# Patient Record
Sex: MALE | Race: WHITE
[De-identification: names, ages, dates, MRNs, and addresses within clinical notes are randomized per-mention and may not be internally consistent; named-entity substitution may affect disease eponyms.]

---

## 2020-05-25 ENCOUNTER — HOSPITAL ENCOUNTER (OUTPATIENT)
Dept: HOSPITAL 62 - ER | Age: 35
Setting detail: OBSERVATION
LOS: 2 days | Discharge: HOME | End: 2020-05-27
Attending: FAMILY MEDICINE | Admitting: FAMILY MEDICINE
Payer: COMMERCIAL

## 2020-05-25 DIAGNOSIS — B27.90: ICD-10-CM

## 2020-05-25 DIAGNOSIS — R19.7: ICD-10-CM

## 2020-05-25 DIAGNOSIS — J96.01: Primary | ICD-10-CM

## 2020-05-25 DIAGNOSIS — R00.0: ICD-10-CM

## 2020-05-25 DIAGNOSIS — Z79.899: ICD-10-CM

## 2020-05-25 DIAGNOSIS — R63.0: ICD-10-CM

## 2020-05-25 DIAGNOSIS — R05: ICD-10-CM

## 2020-05-25 DIAGNOSIS — Z03.818: ICD-10-CM

## 2020-05-25 DIAGNOSIS — R11.0: ICD-10-CM

## 2020-05-25 DIAGNOSIS — F17.210: ICD-10-CM

## 2020-05-25 LAB
A TYPE INFLUENZA AG: NEGATIVE
ADD MANUAL DIFF: NO
ALBUMIN SERPL-MCNC: 4.3 G/DL (ref 3.5–5)
ALP SERPL-CCNC: 87 U/L (ref 38–126)
ANION GAP SERPL CALC-SCNC: 9 MMOL/L (ref 5–19)
APPEARANCE UR: CLEAR
APTT PPP: YELLOW S
AST SERPL-CCNC: 43 U/L (ref 17–59)
B INFLUENZA AG: NEGATIVE
BASOPHILS # BLD AUTO: 0 10^3/UL (ref 0–0.2)
BASOPHILS NFR BLD AUTO: 0.5 % (ref 0–2)
BILIRUB DIRECT SERPL-MCNC: 0.2 MG/DL (ref 0–0.4)
BILIRUB SERPL-MCNC: 0.6 MG/DL (ref 0.2–1.3)
BILIRUB UR QL STRIP: NEGATIVE
BUN SERPL-MCNC: 8 MG/DL (ref 7–20)
CALCIUM: 9.7 MG/DL (ref 8.4–10.2)
CHLORIDE SERPL-SCNC: 98 MMOL/L (ref 98–107)
CO2 SERPL-SCNC: 30 MMOL/L (ref 22–30)
EOSINOPHIL # BLD AUTO: 0.4 10^3/UL (ref 0–0.6)
EOSINOPHIL NFR BLD AUTO: 3.5 % (ref 0–6)
ERYTHROCYTE [DISTWIDTH] IN BLOOD BY AUTOMATED COUNT: 12.4 % (ref 11.5–14)
GLUCOSE SERPL-MCNC: 95 MG/DL (ref 75–110)
GLUCOSE UR STRIP-MCNC: NEGATIVE MG/DL
HCT VFR BLD CALC: 43.1 % (ref 37.9–51)
HGB BLD-MCNC: 15.3 G/DL (ref 13.5–17)
KETONES UR STRIP-MCNC: (no result) MG/DL
LYMPHOCYTES # BLD AUTO: 1.3 10^3/UL (ref 0.5–4.7)
LYMPHOCYTES NFR BLD AUTO: 12.4 % (ref 13–45)
MCH RBC QN AUTO: 31.4 PG (ref 27–33.4)
MCHC RBC AUTO-ENTMCNC: 35.5 G/DL (ref 32–36)
MCV RBC AUTO: 88 FL (ref 80–97)
MONOCYTES # BLD AUTO: 1.1 10^3/UL (ref 0.1–1.4)
MONOCYTES NFR BLD AUTO: 10.9 % (ref 3–13)
NEUTROPHILS # BLD AUTO: 7.5 10^3/UL (ref 1.7–8.2)
NEUTS SEG NFR BLD AUTO: 72.7 % (ref 42–78)
NITRITE UR QL STRIP: NEGATIVE
PH UR STRIP: 6 [PH] (ref 5–9)
PLATELET # BLD: 370 10^3/UL (ref 150–450)
POTASSIUM SERPL-SCNC: 4 MMOL/L (ref 3.6–5)
PROT SERPL-MCNC: 7.5 G/DL (ref 6.3–8.2)
PROT UR STRIP-MCNC: NEGATIVE MG/DL
RBC # BLD AUTO: 4.88 10^6/UL (ref 4.35–5.55)
SP GR UR STRIP: 1.02
TOTAL CELLS COUNTED % (AUTO): 100 %
UROBILINOGEN UR-MCNC: 4 MG/DL (ref ?–2)
WBC # BLD AUTO: 10.3 10^3/UL (ref 4–10.5)

## 2020-05-25 PROCEDURE — 94640 AIRWAY INHALATION TREATMENT: CPT

## 2020-05-25 PROCEDURE — 81001 URINALYSIS AUTO W/SCOPE: CPT

## 2020-05-25 PROCEDURE — 99285 EMERGENCY DEPT VISIT HI MDM: CPT

## 2020-05-25 PROCEDURE — 71045 X-RAY EXAM CHEST 1 VIEW: CPT

## 2020-05-25 PROCEDURE — 86308 HETEROPHILE ANTIBODY SCREEN: CPT

## 2020-05-25 PROCEDURE — 86664 EPSTEIN-BARR NUCLEAR ANTIGEN: CPT

## 2020-05-25 PROCEDURE — 87205 SMEAR GRAM STAIN: CPT

## 2020-05-25 PROCEDURE — 80053 COMPREHEN METABOLIC PANEL: CPT

## 2020-05-25 PROCEDURE — 36415 COLL VENOUS BLD VENIPUNCTURE: CPT

## 2020-05-25 PROCEDURE — 86663 EPSTEIN-BARR ANTIBODY: CPT

## 2020-05-25 PROCEDURE — 87070 CULTURE OTHR SPECIMN AEROBIC: CPT

## 2020-05-25 PROCEDURE — 85025 COMPLETE CBC W/AUTO DIFF WBC: CPT

## 2020-05-25 PROCEDURE — 87635 SARS-COV-2 COVID-19 AMP PRB: CPT

## 2020-05-25 PROCEDURE — 87040 BLOOD CULTURE FOR BACTERIA: CPT

## 2020-05-25 PROCEDURE — 80048 BASIC METABOLIC PNL TOTAL CA: CPT

## 2020-05-25 PROCEDURE — G0378 HOSPITAL OBSERVATION PER HR: HCPCS

## 2020-05-25 PROCEDURE — 87804 INFLUENZA ASSAY W/OPTIC: CPT

## 2020-05-25 PROCEDURE — 71275 CT ANGIOGRAPHY CHEST: CPT

## 2020-05-25 PROCEDURE — S0119 ONDANSETRON 4 MG: HCPCS

## 2020-05-25 PROCEDURE — 87880 STREP A ASSAY W/OPTIC: CPT

## 2020-05-25 PROCEDURE — 86665 EPSTEIN-BARR CAPSID VCA: CPT

## 2020-05-25 RX ADMIN — IPRATROPIUM BROMIDE AND ALBUTEROL SULFATE PRN ML: 2.5; .5 SOLUTION RESPIRATORY (INHALATION) at 20:19

## 2020-05-25 NOTE — RADIOLOGY REPORT (SQ)
EXAM DESCRIPTION:  CHEST SINGLE VIEW



IMAGES COMPLETED DATE/TIME:  5/25/2020 2:59 pm



REASON FOR STUDY:  LOW O2



COMPARISON:  None.



EXAM PARAMETERS:  NUMBER OF VIEWS: One view.

TECHNIQUE: Single frontal radiographic view of the chest acquired.

RADIATION DOSE: NA

LIMITATIONS: None.



FINDINGS:  LUNGS AND PLEURA: Hazy airspace disease in the lung bases.

MEDIASTINUM AND HILAR STRUCTURES: No masses.  Contour normal.

HEART AND VASCULAR STRUCTURES: Heart normal in size.  Normal vasculature.

BONES: No acute findings.

HARDWARE: None in the chest.

OTHER: No other significant finding.



IMPRESSION:  HAZY AIRSPACE DISEASE IN THE LUNG BASES.



TECHNICAL DOCUMENTATION:  JOB ID:  [id number]

 2011 [employer]- All Rights Reserved



Reading location - IP/workstation name: [email or web address]

## 2020-05-25 NOTE — RADIOLOGY REPORT (SQ)
EXAM DESCRIPTION:  CTA CHEST



IMAGES COMPLETED DATE/TIME:  5/25/2020 4:05 pm



REASON FOR STUDY:  COUGH, HYPOXIA .  Low O2 saturation.



COMPARISON:  Chest radiograph same date.



TECHNIQUE:  CT scan of the chest performed using helical scanning technique with dynamic intravenous 
contrast injection.  Images reviewed with lung, soft tissue and bone windows.  Reconstructed coronal 
and sagittal MPR images reviewed.

Additional 3 dimensional post-processing performed to develop Maximal Intensity Projection images (MI
P).  All images stored on PACS.

All CT scanners at this facility use dose modulation, iterative reconstruction, and/or weight based d
osing when appropriate to reduce radiation dose to as low as reasonably achievable (ALARA).

[facility]: Dose Right  [facility]: CareDose    [facility]: Dose Right    [facility]: Teradose 4D    [facility]: Smart Technologies



CONTRAST TYPE AND DOSE:  contrast/concentration: Isovue 350.00 mg/ml; Total Contrast Delivered: 70.0 
ml; Total Saline Delivered: 36.8 ml

Contrast bolus optimized for the pulmonary arteries. Not diagnostic for the aorta.



RENAL FUNCTION:  GFR > 60.



RADIATION DOSE:  CT Rad equipment meets quality standard of care and radiation dose reduction techniq
ues were employed. CTDIvol: 13.2 - 18.9 mGy. DLP: 790 mGy-cm. .



LIMITATIONS:  None.



FINDINGS:  LUNGS AND PLEURA: The trachea has normal caliber and appearance.  There are patchy areas o
f tree-in-bud and nodular consolidation with areas of ground-glass attenuation in both lungs, most pr
edominant in the right lower low and lingula.  No pleural effusion or pneumothorax.

AORTA AND GREAT VESSELS: No aneurysm.  Contrast bolus not optimized for the aorta.  Arch origin of th
e left vertebral artery, a normal variant.

HEART: No pericardial effusion. No significant coronary artery calcifications.

PULMONARY ARTERIES: No emboli visualized in the main pulmonary arteries or the segmental branches.  E
valuation is somewhat limited due to bolus timing and respiratory motion.

HILAR AND MEDIASTINAL STRUCTURES: No identified masses or abnormal nodes.

HARDWARE: None in the chest.

UPPER ABDOMEN: Left renal cortical cyst.

THYROID AND OTHER SOFT TISSUES: No masses.  No adenopathy.

BONES: No acute or significant finding.

3D MIPS: Confirm above findings.

OTHER: No other significant finding.



IMPRESSION:

1. Patchy areas of tree-in-bud and ground-glass attenuation in both lungs, predominantly in the right
 lower lobe.  Commonly reported imaging features of COVID-19  pneumonia are present. Other processes 
such as influenza pneumonia and organizing pneumonia, as can be seen with drug toxicity and connectiv
e tissue disease, can cause a similar imaging pattern.

2. No pulmonary embolism.  Evaluation is mildly limited due to respiratory motion and bolus timing.

3. Left renal cortical cyst.



COMMENT:  Quality ID # [id number]: Final reports with documentation of one or more dose reduction techniques
 (e.g., Automated exposure control, adjustment of the mA and/or kV according to patient size, use of 
iterative reconstruction technique)



TECHNICAL DOCUMENTATION:  JOB ID:  [id number]

 2011 [employer]- All Rights Reserved



Reading location - IP/workstation name: [email or web address]

## 2020-05-25 NOTE — PDOC H&P
History of Present Illness


History of Present Illness: 


JHONNY KRUEGER is a 34 year old male who was recently discharged from the 

[employer] due to medical issues with his lower back who presents with upper 

respiratory symptoms.  He said he has been sick for the past 10 or 11 days.  His

wife was initially sick 3 or 4 days ahead of him, she was treated with 

amoxicillin for an ear infection and she has since convalesced.  He was also 

given some amoxicillin for the same reason, but he says his symptoms have not 

improved.  He has also used an albuterol inhaler along with some Mucinex and 

some over-the-counter cough medications but his symptoms have not improved.  He 

said he is been intermittently febrile, at least he feels like he has been 

febrile.  He has had a wet cough that has been productive of some whitish 

sputum.  He used to smoke a pack a day for about 6 years but has not smoked 

since any of his symptoms began.  He said his wife was tested for the flu and 

for the coronavirus, both were negative.  He said he has also been tested for 

influenza and coronavirus, both were negative.  He said he thinks it was the 

rapid test because they took the sample 1 day and called him the next day with 

the result.  He denies vaping or smoking any other drugs.  He denies any other 

drug use.  [place]  He said that everybody on basis pretty 

much keeping them to themselves.  He said the only time he is left the house is 

been to go to [employer] or the grocery store.  He lives with his wife, his 2 kids,

and his mother-in-law.  He said his mother-in-law is from [facility] and 

said that when she was there a couple of months ago there was something going 

around and she got it but she got over it.  She was asymptomatic whenever she 

came to their house.  Neither the mother-in-law nor the 2 children have been 

sick since all of this began with the patient and his wife.  In the ER he was 

hypoxic, a couple of times his pulse oximetry dropped into the mid 80s on room 

air.  He was tachycardic and looked flushed.  He had chest x-ray and chest CT 

findings consistent with a bibasilar pneumonitis.  His CBC was unremarkable.  He

is afebrile here.  He has a wet cough and was coughing frequently and he had 

some crackles on chest exam.








Past Medical History


Pulmonary Medical History: Reports: Bronchitis, Pneumonia





Past Surgical History


Past Surgical History: Reports: Orthopedic Surgery - RIGHT KNEE, JAW





Social History


Lives with: Family


Smoking Status: Current Every Day Smoker


Electronic Cigarette use?: No





Family History


Family History: None


Parental Family History Reviewed: Yes - Hypertension, hyperlipidemia


Children Family History Reviewed: Yes - Both healthy


Sibling(s) Family History Reviewed.: Yes - Nothing known





Medication/Allergy


Allergies/Adverse Reactions: 


                                        





No Known Drug Allergies Allergy (Verified 05/25/20 14:14)


   











Review of Systems


All systems: reviewed and no additional remarkable complaints except as stated -

All systems were reviewed and were negative except as noted in the HPI





Physical Exam


Vital Signs: 


                                        











Temp Pulse Resp BP Pulse Ox


 


 98.8 F   81   24 H  136/81 H  92 


 


 05/25/20 14:08  05/25/20 14:08  05/25/20 14:08  05/25/20 14:08  05/25/20 15:24








                                 Intake & Output











 05/24/20 05/25/20 05/26/20





 06:59 06:59 06:59


 


Weight   104.5 kg











General appearance: PRESENT: cooperative, mild distress, well-developed, well-no

urished


Head exam: PRESENT: atraumatic, normocephalic


Eye exam: PRESENT: EOMI, PERRLA.  ABSENT: conjunctival injection, nystagmus, 

scleral icterus


Ear exam: PRESENT: normal external ear exam


Mouth exam: PRESENT: moist, neck supple


Throat exam: ABSENT: post pharyngeal erythema


Neck exam: PRESENT: full ROM.  ABSENT: carotid bruit, JVD, lymphadenopathy, 

meningismus, tenderness, thyromegaly


Respiratory exam: PRESENT: chest wall tenderness, crackles - Bibasilar, 

symmetrical, tachypnea, unlabored.  ABSENT: accessory muscle use, prolonged 

expiratory phas, rhonchi, wheezes


Cardiovascular exam: PRESENT: +S1, +S2, tachycardia.  ABSENT: diastolic murmur, 

systolic murmur


Pulses: PRESENT: normal carotid pulses


Vascular exam: PRESENT: normal capillary refill


GI/Abdominal exam: PRESENT: normal bowel sounds, soft.  ABSENT: distended, 

guarding, rebound, tenderness


Extremities exam: ABSENT: clubbing, pedal edema


Musculoskeletal exam: PRESENT: normal inspection.  ABSENT: deformity


Neurological exam: PRESENT: alert, awake, oriented to person, oriented to place,

oriented to time, oriented to situation, CN II-XII grossly intact.  ABSENT: 

motor sensory deficit


Psychiatric exam: PRESENT: appropriate affect, normal mood


Skin exam: PRESENT: dry, warm, other - Flushed





Results


Laboratory Results: 


                                        





                                 05/25/20 15:17 





                                 05/25/20 15:17 





                                        











  05/25/20 05/25/20 05/25/20





  15:17 15:17 15:32


 


WBC  10.3  


 


RBC  4.88  


 


Hgb  15.3  


 


Hct  43.1  


 


MCV  88  


 


MCH  31.4  


 


MCHC  35.5  


 


RDW  12.4  


 


Plt Count  370  


 


Seg Neutrophils %  72.7  


 


Sodium   136.9 L 


 


Potassium   4.0 


 


Chloride   98 


 


Carbon Dioxide   30 


 


Anion Gap   9 


 


BUN   8 


 


Creatinine   0.88 


 


Est GFR ( Amer)   > 60 


 


Glucose   95 


 


Calcium   9.7 


 


Total Bilirubin   0.6 


 


AST   43 


 


Alkaline Phosphatase   87 


 


Total Protein   7.5 


 


Albumin   4.3 


 


Urine Color    YELLOW


 


Urine Appearance    CLEAR


 


Urine pH    6.0


 


Ur Specific Gravity    1.019


 


Urine Protein    NEGATIVE


 


Urine Glucose (UA)    NEGATIVE


 


Urine Ketones    TRACE H


 


Urine Blood    NEGATIVE


 


Urine Nitrite    NEGATIVE


 


Ur Leukocyte Esterase    NEGATIVE


 


Urine WBC (Auto)    1


 


Urine RBC (Auto)    5











Impressions: 


                                        





Chest X-Ray  05/25/20 00:00


IMPRESSION:  HAZY AIRSPACE DISEASE IN THE LUNG BASES.


 








Chest/Abdomen CTA  05/25/20 14:49


IMPRESSION:


1. Patchy areas of tree-in-bud and ground-glass attenuation in both lungs, 

predominantly in the right lower lobe.  Commonly reported imaging features of 

COVID-19  pneumonia are present. Other processes such as influenza pneumonia and

organizing pneumonia, as can be seen with drug toxicity and connective tissue 

disease, can cause a similar imaging pattern.


2. No pulmonary embolism.  Evaluation is mildly limited due to respiratory 

motion and bolus timing.


3. Left renal cortical cyst.


 














Assessment and Plan





- Diagnosis


(1) Acute hypoxemic respiratory failure


Is this a current diagnosis for this admission?: Yes   


Plan: 


Will monitor on pulse oximetry and use supplemental O2 as needed to maintain 

SPO2 greater than 90%








(2) Suspected infectious disease


Is this a current diagnosis for this admission?: Yes   


Plan: 


While testing him for viral infections, will empirically treat with Rocephin and

azithromycin for potential community acquired pneumonia








(3) Person under investigation for COVID-19


Is this a current diagnosis for this admission?: Yes   


Plan: 


Appropriate precautions taken.  Initial rapid test was negative, but his 

presentation is somewhat suspicious and so we are going to send him out for PCR 

confirmation.








(4) Current every day smoker


Is this a current diagnosis for this admission?: Yes   


Plan: 


Counseled regarding the negative effects of cigarette smoking on his current and

long-term health.  He has not smoked since he first got sick with this about a 

week and a half ago, and I strongly recommended that he quit.








- Time


Time Spent with patient: 35 or more minutes





- Inpatient Certification


Based on my medical assessment, after consideration of the patient's 

comorbidities, presenting symptoms, or acuity I expect that the services needed 

warrant INPATIENT care.: Yes


I certify that my determination is in accordance with my understanding of 

Medicare's requirements for reasonable and necessary INPATIENT services [42 CFR 

412.3e].: Yes


Medical Necessity: Failure to Improve With Outpatient Therapy, Need Close 

Monitoring Due to Risk of Patient Decompensation, Need For Continuous Telemetry 

Monitoring, Need for IV Antibiotics, Risk of Complication if Not Cared For in 

Hospital

## 2020-05-26 LAB
ADD MANUAL DIFF: NO
ANION GAP SERPL CALC-SCNC: 11 MMOL/L (ref 5–19)
BASOPHILS # BLD AUTO: 0 10^3/UL (ref 0–0.2)
BASOPHILS NFR BLD AUTO: 0.4 % (ref 0–2)
BUN SERPL-MCNC: 7 MG/DL (ref 7–20)
CALCIUM: 9.2 MG/DL (ref 8.4–10.2)
CHLORIDE SERPL-SCNC: 99 MMOL/L (ref 98–107)
CO2 SERPL-SCNC: 26 MMOL/L (ref 22–30)
EOSINOPHIL # BLD AUTO: 0.3 10^3/UL (ref 0–0.6)
EOSINOPHIL NFR BLD AUTO: 3.1 % (ref 0–6)
ERYTHROCYTE [DISTWIDTH] IN BLOOD BY AUTOMATED COUNT: 12.3 % (ref 11.5–14)
GLUCOSE SERPL-MCNC: 101 MG/DL (ref 75–110)
HCT VFR BLD CALC: 39.1 % (ref 37.9–51)
HGB BLD-MCNC: 14.1 G/DL (ref 13.5–17)
LYMPHOCYTES # BLD AUTO: 1.4 10^3/UL (ref 0.5–4.7)
LYMPHOCYTES NFR BLD AUTO: 14.2 % (ref 13–45)
MCH RBC QN AUTO: 31.4 PG (ref 27–33.4)
MCHC RBC AUTO-ENTMCNC: 36.1 G/DL (ref 32–36)
MCV RBC AUTO: 87 FL (ref 80–97)
MONOCYTES # BLD AUTO: 1 10^3/UL (ref 0.1–1.4)
MONOCYTES NFR BLD AUTO: 10.4 % (ref 3–13)
NEUTROPHILS # BLD AUTO: 7 10^3/UL (ref 1.7–8.2)
NEUTS SEG NFR BLD AUTO: 71.9 % (ref 42–78)
PLATELET # BLD: 331 10^3/UL (ref 150–450)
POTASSIUM SERPL-SCNC: 4 MMOL/L (ref 3.6–5)
RBC # BLD AUTO: 4.49 10^6/UL (ref 4.35–5.55)
TOTAL CELLS COUNTED % (AUTO): 100 %
WBC # BLD AUTO: 9.7 10^3/UL (ref 4–10.5)

## 2020-05-26 RX ADMIN — IPRATROPIUM BROMIDE AND ALBUTEROL SULFATE PRN ML: 2.5; .5 SOLUTION RESPIRATORY (INHALATION) at 13:57

## 2020-05-26 NOTE — PDOC PROGRESS REPORT
Subjective


Progress Note for:: 05/26/20


Subjective:: 


No adverse events overnight.  No new complaints.  He said he feels a little 

better but he still has a cough.  He still requiring some oxygen per nasal 

cannula.  His appetite has not been great.  He confirmed that his wife was sick,

and then shortly after contact with her he got sick.  His Monospot was positive.

 He said that he was also sick a few months ago after visiting some friends out 

of state.  He does not know if what they had was mono.  His wife has not been 

tested for mono.


Reason For Visit: 


HYPERKALEMIA, HYPOMAGNESEMIA, ELEVATED LIPASE








Physical Exam


Vital Signs: 


                                        











Temp Pulse Resp BP Pulse Ox


 


 98.4 F   86   20   148/80 H  92 


 


 05/26/20 15:04  05/26/20 15:04  05/26/20 15:04  05/26/20 15:04  05/26/20 15:04








                                 Intake & Output











 05/25/20 05/26/20 05/27/20





 06:59 06:59 06:59


 


Intake Total  300 1282


 


Balance  300 1282


 


Weight  101.5 kg 








General appearance: PRESENT: cooperative, no apparent distress, well-developed, 

well-nourished


Respiratory exam: PRESENT: crackles -faint bibasilar, symmetrical, tachypnea, 

unlabored.  ABSENT: accessory muscle use, prolonged expiratory phase, rhonchi, 

wheezes


Cardiovascular exam: PRESENT: +S1, +S2, RRR.  ABSENT: diastolic murmur, systolic

murmur


Pulses: PRESENT: normal carotid pulses


Vascular exam: PRESENT: normal capillary refill


GI/Abdominal exam: PRESENT: normal bowel sounds, soft.  ABSENT: distended, 

guarding, rebound, tenderness


Extremities exam: ABSENT: clubbing, pedal edema


Musculoskeletal exam: PRESENT: normal inspection.  ABSENT: deformity


Neurological exam: PRESENT: alert, awake, oriented to person, oriented to place,

oriented to time, oriented to situation


Psychiatric exam: PRESENT: appropriate affect, normal mood


Skin exam: PRESENT: dry, warm





Results


Laboratory Results: 


                                        





                                 05/26/20 04:40 





                                 05/26/20 04:40 





                                        











  05/26/20 05/26/20





  04:40 04:40


 


WBC  9.7 


 


RBC  4.49 


 


Hgb  14.1 


 


Hct  39.1 


 


MCV  87 


 


MCH  31.4 


 


MCHC  36.1 H 


 


RDW  12.3 


 


Plt Count  331 


 


Seg Neutrophils %  71.9 


 


Sodium   136.3 L


 


Potassium   4.0


 


Chloride   99


 


Carbon Dioxide   26


 


Anion Gap   11


 


BUN   7


 


Creatinine   0.73


 


Est GFR (African Amer)   > 60


 


Glucose   101


 


Calcium   9.2











Impressions: 


                                        





Chest X-Ray  05/25/20 00:00


IMPRESSION:  HAZY AIRSPACE DISEASE IN THE LUNG BASES.


 








Chest/Abdomen CTA  05/25/20 14:49


IMPRESSION:


1. Patchy areas of tree-in-bud and ground-glass attenuation in both lungs, 

predominantly in the right lower lobe.  Commonly reported imaging features of 

COVID-19  pneumonia are present. Other processes such as influenza pneumonia and

organizing pneumonia, as can be seen with drug toxicity and connective tissue 

disease, can cause a similar imaging pattern.


2. No pulmonary embolism.  Evaluation is mildly limited due to respiratory 

motion and bolus timing.


3. Left renal cortical cyst.


 














Assessment and Plan





- Diagnosis


(1) Acute hypoxemic respiratory failure


Is this a current diagnosis for this admission?: Yes   


Plan: 


Will monitor on pulse oximetry and use supplemental O2 as needed to maintain 

SPO2 greater than 90%








(2) Suspected infectious disease


Is this a current diagnosis for this admission?: Yes   


Plan: 


While testing him for viral infections, will empirically treat with Rocephin and

azithromycin for potential community acquired pneumonia.  Monospot was positive,

but he does seem to be doing a little bit better after a day of antibiotics.








(3) Person under investigation for COVID-19


Is this a current diagnosis for this admission?: Yes   


Plan: 


COVID-19 testing was negative








(4) Current every day smoker


Is this a current diagnosis for this admission?: Yes   


Plan: 


Counseled regarding the negative effects of cigarette smoking on his current and

long-term health.  He has not smoked since he first got sick with this about a 

week and a half ago, and I strongly recommended that he quit.








- Time


Time Spent with patient: 15-24 minutes

## 2020-05-27 VITALS — DIASTOLIC BLOOD PRESSURE: 64 MMHG | SYSTOLIC BLOOD PRESSURE: 119 MMHG

## 2020-05-27 LAB
ADD MANUAL DIFF: NO
ANION GAP SERPL CALC-SCNC: 10 MMOL/L (ref 5–19)
BASOPHILS # BLD AUTO: 0.1 10^3/UL (ref 0–0.2)
BASOPHILS NFR BLD AUTO: 0.7 % (ref 0–2)
BUN SERPL-MCNC: 10 MG/DL (ref 7–20)
CALCIUM: 9.1 MG/DL (ref 8.4–10.2)
CHLORIDE SERPL-SCNC: 101 MMOL/L (ref 98–107)
CO2 SERPL-SCNC: 27 MMOL/L (ref 22–30)
EOSINOPHIL # BLD AUTO: 0.4 10^3/UL (ref 0–0.6)
EOSINOPHIL NFR BLD AUTO: 5.6 % (ref 0–6)
ERYTHROCYTE [DISTWIDTH] IN BLOOD BY AUTOMATED COUNT: 12.2 % (ref 11.5–14)
GLUCOSE SERPL-MCNC: 97 MG/DL (ref 75–110)
HCT VFR BLD CALC: 39.8 % (ref 37.9–51)
HGB BLD-MCNC: 13.9 G/DL (ref 13.5–17)
LYMPHOCYTES # BLD AUTO: 1.7 10^3/UL (ref 0.5–4.7)
LYMPHOCYTES NFR BLD AUTO: 22.4 % (ref 13–45)
MCH RBC QN AUTO: 31 PG (ref 27–33.4)
MCHC RBC AUTO-ENTMCNC: 35 G/DL (ref 32–36)
MCV RBC AUTO: 89 FL (ref 80–97)
MONOCYTES # BLD AUTO: 0.9 10^3/UL (ref 0.1–1.4)
MONOCYTES NFR BLD AUTO: 11.4 % (ref 3–13)
NEUTROPHILS # BLD AUTO: 4.7 10^3/UL (ref 1.7–8.2)
NEUTS SEG NFR BLD AUTO: 59.9 % (ref 42–78)
PLATELET # BLD: 345 10^3/UL (ref 150–450)
POTASSIUM SERPL-SCNC: 4.1 MMOL/L (ref 3.6–5)
RBC # BLD AUTO: 4.49 10^6/UL (ref 4.35–5.55)
TOTAL CELLS COUNTED % (AUTO): 100 %
WBC # BLD AUTO: 7.8 10^3/UL (ref 4–10.5)

## 2020-05-27 NOTE — PDOC DISCHARGE SUMMARY
Impression





- Admit/DC Date/PCP


Admission Date/Primary Care Provider: 


  05/25/20 19:11





  





Discharge Date: 05/27/20





- Discharge Diagnosis


(1) Acute hypoxemic respiratory failure


Is this a current diagnosis for this admission?: Yes   





(2) Suspected infectious disease


Is this a current diagnosis for this admission?: Yes   





(3) Person under investigation for COVID-19


Is this a current diagnosis for this admission?: Yes   





(4) Current every day smoker


Is this a current diagnosis for this admission?: Yes   





- Additional Information


Resuscitation Status: Full Code


Discharge Diet: Regular


Discharge Activity: Activity As Tolerated, Balance Activity w/Rest, Energy 

Conservation


Prescriptions: 


Levofloxacin [Levaquin 750 mg Tablet] 750 mg PO DAILY #5 tablet


Home Medications: 








Acetaminophen [Acetaminophen Extra Strength] 2 mg PO DAILYP PRN 05/26/20 


Albuterol Sulfate [Albuterol Sulfate Hfa] 2 puff IH Q4HP PRN 05/26/20 


Guaifenesin [Mucus ER] 1,200 mg PO BID 05/26/20 


Ibuprofen 400 mg PO DAILYP PRN 05/26/20 


Levofloxacin [Levaquin 750 mg Tablet] 750 mg PO DAILY #5 tablet 05/27/20 











History of Present Illiness


History of Present Illness: 


JHONNY KRUEGER is a 34 year old male who was recently discharged from the 

[employer] due to medical issues with his lower back who presents with upper 

respiratory symptoms.  He said he has been sick for the past 10 or 11 days.  His

wife was initially sick 3 or 4 days ahead of him, she was treated with 

amoxicillin for an ear infection and she has since convalesced.  He was also 

given some amoxicillin for the same reason, but he says his symptoms have not 

improved.  He has also used an albuterol inhaler along with some Mucinex and 

some over-the-counter cough medications but his symptoms have not improved.  He 

said he is been intermittently febrile, at least he feels like he has been 

febrile.  He has had a wet cough that has been productive of some whitish 

sputum.  He used to smoke a pack a day for about 6 years but has not smoked 

since any of his symptoms began.  He said his wife was tested for the flu and 

for the coronavirus, both were negative.  He said he has also been tested for 

influenza and coronavirus, both were negative.  He said he thinks it was the 

rapid test because they took the sample 1 day and called him the next day with 

the result.  He denies vaping or smoking any other drugs.  He denies any other 

drug use.  [place].  He said that everybody on basis pretty 

much keeping them to themselves.  He said the only time he is left the house is 

been to go to [employer] or the grocery store.  He lives with his wife, his 2 kids,

and his mother-in-law.  He said his mother-in-law is from [facility] and 

said that when she was there a couple of months ago there was something going 

around and she got it but she got over it.  She was asymptomatic whenever she 

came to their house.  Neither the mother-in-law nor the 2 children have been 

sick since all of this began with the patient and his wife.  In the ER he was 

hypoxic, a couple of times his pulse oximetry dropped into the mid 80s on room 

air.  He was tachycardic and looked flushed.  He had chest x-ray and chest CT 

findings consistent with a bibasilar pneumonitis.  His CBC was unremarkable.  He

is afebrile here.  He has a wet cough and was coughing frequently and he had 

some crackles on chest exam.








Hospital Course


Hospital Course: 


He tested positive for mononucleosis.  He said that he has been sick in the past

year for upper respiratory infection that dragged on for a few weeks, so it 

still possible that his acute illness is not mononucleosis and he still just 

testing positive because a heterophile antibody can be positive for up to a year

after the illness.  He was empirically treated with some antibiotics.  He 

initially needed oxygen, but is now 94% on room air.  He is feeling better.  I 

am going to send him home with a few days of some Levaquin.  I have instructed 

him to return to the hospital if he has a fever or he has trouble breathing.  He

verbalizes understanding.  I again recommended that he continue to quit smoking.

 His labs and examination were reassuring and he was discharged in stable 

condition.





Physical Exam


Vital Signs: 


                                        











Temp Pulse Resp BP Pulse Ox


 


 97.9 F   76   16   130/78 H  96 


 


 05/27/20 11:30  05/27/20 14:32  05/27/20 14:32  05/27/20 11:30  05/27/20 14:32








                                 Intake & Output











 05/26/20 05/27/20 05/28/20





 06:59 06:59 06:59


 


Intake Total 300 1862 120


 


Output Total  220 


 


Balance 300 1642 120


 


Weight 101.5 kg 101.5 kg 








General appearance: PRESENT: cooperative, no apparent distress, well-developed, 

well-nourished


Respiratory exam: PRESENT: crackles -faint bibasilar, symmetrical, tachypnea, 

unlabored.  ABSENT: accessory muscle use, prolonged expiratory phase, rhonchi, 

wheezes


Cardiovascular exam: PRESENT: +S1, +S2, RRR.  ABSENT: diastolic murmur, systolic

murmur


Pulses: PRESENT: normal carotid pulses


Vascular exam: PRESENT: normal capillary refill


GI/Abdominal exam: PRESENT: normal bowel sounds, soft.  ABSENT: distended, 

guarding, rebound, tenderness


Extremities exam: ABSENT: clubbing, pedal edema


Musculoskeletal exam: PRESENT: normal inspection.  ABSENT: deformity


Neurological exam: PRESENT: alert, awake, oriented to person, oriented to place,

oriented to time, oriented to situation


Psychiatric exam: PRESENT: appropriate affect, normal mood


Skin exam: PRESENT: dry, warm





Results


Laboratory Results: 


                                        











WBC  7.8 10^3/uL (4.0-10.5)   05/27/20  04:41    


 


RBC  4.49 10^6/uL (4.35-5.55)   05/27/20  04:41    


 


Hgb  13.9 g/dL (13.5-17.0)   05/27/20  04:41    


 


Hct  39.8 % (37.9-51.0)   05/27/20  04:41    


 


MCV  89 fl (80-97)   05/27/20  04:41    


 


MCH  31.0 pg (27.0-33.4)   05/27/20  04:41    


 


MCHC  35.0 g/dL (32.0-36.0)   05/27/20  04:41    


 


RDW  12.2 % (11.5-14.0)   05/27/20  04:41    


 


Plt Count  345 10^3/uL (150-450)   05/27/20  04:41    


 


Lymph % (Auto)  22.4 % (13-45)   05/27/20  04:41    


 


Mono % (Auto)  11.4 % (3-13)   05/27/20  04:41    


 


Eos % (Auto)  5.6 % (0-6)   05/27/20  04:41    


 


Baso % (Auto)  0.7 % (0-2)   05/27/20  04:41    


 


Absolute Neuts (auto)  4.7 10^3/uL (1.7-8.2)   05/27/20  04:41    


 


Absolute Lymphs (auto)  1.7 10^3/uL (0.5-4.7)   05/27/20  04:41    


 


Absolute Monos (auto)  0.9 10^3/uL (0.1-1.4)   05/27/20  04:41    


 


Absolute Eos (auto)  0.4 10^3/uL (0.0-0.6)   05/27/20  04:41    


 


Absolute Basos (auto)  0.1 10^3/uL (0.0-0.2)   05/27/20  04:41    


 


Seg Neutrophils %  59.9 % (42-78)   05/27/20  04:41    


 


Sodium  138.1 mmol/L (137-145)   05/27/20  04:41    


 


Potassium  4.1 mmol/L (3.6-5.0)   05/27/20  04:41    


 


Chloride  101 mmol/L ()   05/27/20  04:41    


 


Carbon Dioxide  27 mmol/L (22-30)   05/27/20  04:41    


 


Anion Gap  10  (5-19)   05/27/20  04:41    


 


BUN  10 mg/dL (7-20)   05/27/20  04:41    


 


Creatinine  0.72 mg/dL (0.52-1.25)   05/27/20  04:41    


 


Est GFR ( Amer)  > 60  (>60)   05/27/20  04:41    


 


Est GFR (MDRD) Non-Af  > 60  (>60)   05/27/20  04:41    


 


Glucose  97 mg/dL ()   05/27/20  04:41    


 


Calcium  9.1 mg/dL (8.4-10.2)   05/27/20  04:41    


 


Total Bilirubin  0.6 mg/dL (0.2-1.3)   05/25/20  15:17    


 


Direct Bilirubin  0.2 mg/dL (0.0-0.4)   05/25/20  15:17    


 


Neonat Total Bilirubin  Not Reportable   05/25/20  15:17    


 


Neonat Direct Bilirubin  Not Reportable   05/25/20  15:17    


 


Neonat Indirect Bili  Not Reportable   05/25/20  15:17    


 


AST  43 U/L (17-59)   05/25/20  15:17    


 


ALT  60 U/L (<50)  H  05/25/20  15:17    


 


Alkaline Phosphatase  87 U/L ()   05/25/20  15:17    


 


Total Protein  7.5 g/dL (6.3-8.2)   05/25/20  15:17    


 


Albumin  4.3 g/dL (3.5-5.0)   05/25/20  15:17    


 


Urine Color  YELLOW   05/25/20  15:32    


 


Urine Appearance  CLEAR   05/25/20  15:32    


 


Urine pH  6.0  (5.0-9.0)   05/25/20  15:32    


 


Ur Specific Gravity  1.019   05/25/20  15:32    


 


Urine Protein  NEGATIVE mg/dL (NEGATIVE)   05/25/20  15:32    


 


Urine Glucose (UA)  NEGATIVE mg/dL (NEGATIVE)   05/25/20  15:32    


 


Urine Ketones  TRACE mg/dL (NEGATIVE)  H  05/25/20  15:32    


 


Urine Blood  NEGATIVE  (NEGATIVE)   05/25/20  15:32    


 


Urine Nitrite  NEGATIVE  (NEGATIVE)   05/25/20  15:32    


 


Urine Bilirubin  NEGATIVE  (NEGATIVE)   05/25/20  15:32    


 


Urine Urobilinogen  4.0 mg/dL (<2.0)  H  05/25/20  15:32    


 


Ur Leukocyte Esterase  NEGATIVE  (NEGATIVE)   05/25/20  15:32    


 


Urine WBC (Auto)  1 /HPF  05/25/20  15:32    


 


Urine RBC (Auto)  5 /HPF  05/25/20  15:32    


 


Squamous Epi Cells Auto  <1 /HPF  05/25/20  15:32    


 


Urine Mucus (Auto)  RARE /LPF  05/25/20  15:32    


 


Urine Ascorbic Acid  20  (NEGATIVE)  H  05/25/20  15:32    


 


Monotest  POSITIVE  (NEGATIVE)  H  05/25/20  15:17    


 


Influenza A (Rapid)  NEGATIVE  (NEGATIVE)   05/25/20  15:17    


 


Influenza B (Rapid)  NEGATIVE  (NEGATIVE)   05/25/20  15:17    


 


SARS-CoV-2 (PCR)  NEGATIVE  (NEGATIVE)   05/25/20  20:04    


 


Group A Strep Rapid  NEGATIVE  (NEGATIVE)   05/25/20  15:17    











Impressions: 


                                        





Chest X-Ray  05/25/20 00:00


IMPRESSION:  HAZY AIRSPACE DISEASE IN THE LUNG BASES.


 








Chest/Abdomen CTA  05/25/20 14:49


IMPRESSION:


1. Patchy areas of tree-in-bud and ground-glass attenuation in both lungs, 

predominantly in the right lower lobe.  Commonly reported imaging features of 

COVID-19  pneumonia are present. Other processes such as influenza pneumonia and

organizing pneumonia, as can be seen with drug toxicity and connective tissue 

disease, can cause a similar imaging pattern.


2. No pulmonary embolism.  Evaluation is mildly limited due to respiratory 

motion and bolus timing.


3. Left renal cortical cyst.


 














Plan


Time Spent: Greater than 30 Minutes





Stroke


Is this a Stroke Patient?: No





Acute Heart Failure





- **


Is this a Heart Failure Patient?: No

## 2020-05-28 LAB
EPSTEIN BARR NUCLEAR AG IGG AB: 55 U/ML (ref 0–17.9)
EPSTEIN BARR VCA IGG AB: 382 U/ML (ref 0–17.9)
EPSTEIN BARR VCA IGM AB: <36 U/ML (ref 0–35.9)